# Patient Record
Sex: FEMALE | Race: WHITE | Employment: FULL TIME | URBAN - METROPOLITAN AREA
[De-identification: names, ages, dates, MRNs, and addresses within clinical notes are randomized per-mention and may not be internally consistent; named-entity substitution may affect disease eponyms.]

---

## 2020-02-18 ENCOUNTER — OFFICE VISIT (OUTPATIENT)
Dept: URGENT CARE | Facility: URGENT CARE | Age: 20
End: 2020-02-18
Payer: COMMERCIAL

## 2020-02-18 VITALS
DIASTOLIC BLOOD PRESSURE: 80 MMHG | HEIGHT: 66 IN | OXYGEN SATURATION: 96 % | TEMPERATURE: 98.4 F | WEIGHT: 175 LBS | SYSTOLIC BLOOD PRESSURE: 142 MMHG | HEART RATE: 89 BPM | BODY MASS INDEX: 28.12 KG/M2

## 2020-02-18 DIAGNOSIS — R30.0 DYSURIA: Primary | ICD-10-CM

## 2020-02-18 DIAGNOSIS — R31.0 GROSS HEMATURIA: ICD-10-CM

## 2020-02-18 LAB
ALBUMIN UR-MCNC: NEGATIVE MG/DL
APPEARANCE UR: ABNORMAL
BACTERIA #/AREA URNS HPF: ABNORMAL /HPF
BILIRUB UR QL STRIP: NEGATIVE
COLOR UR AUTO: YELLOW
GLUCOSE UR STRIP-MCNC: NEGATIVE MG/DL
HGB UR QL STRIP: ABNORMAL
KETONES UR STRIP-MCNC: NEGATIVE MG/DL
LEUKOCYTE ESTERASE UR QL STRIP: NEGATIVE
MUCOUS THREADS #/AREA URNS LPF: PRESENT /LPF
NITRATE UR QL: NEGATIVE
NON-SQ EPI CELLS #/AREA URNS LPF: ABNORMAL /LPF
PH UR STRIP: 5.5 PH (ref 5–7)
RBC #/AREA URNS AUTO: ABNORMAL /HPF
SOURCE: ABNORMAL
SP GR UR STRIP: >1.03 (ref 1–1.03)
UROBILINOGEN UR STRIP-ACNC: 0.2 EU/DL (ref 0.2–1)
WBC #/AREA URNS AUTO: ABNORMAL /HPF

## 2020-02-18 PROCEDURE — 81001 URINALYSIS AUTO W/SCOPE: CPT | Performed by: PHYSICIAN ASSISTANT

## 2020-02-18 PROCEDURE — 87186 SC STD MICRODIL/AGAR DIL: CPT | Performed by: PHYSICIAN ASSISTANT

## 2020-02-18 PROCEDURE — 99203 OFFICE O/P NEW LOW 30 MIN: CPT | Performed by: PHYSICIAN ASSISTANT

## 2020-02-18 PROCEDURE — 87086 URINE CULTURE/COLONY COUNT: CPT | Performed by: PHYSICIAN ASSISTANT

## 2020-02-18 PROCEDURE — 87088 URINE BACTERIA CULTURE: CPT | Performed by: PHYSICIAN ASSISTANT

## 2020-02-18 RX ORDER — NITROFURANTOIN 25; 75 MG/1; MG/1
100 CAPSULE ORAL 2 TIMES DAILY
Qty: 14 CAPSULE | Refills: 0 | Status: SHIPPED | OUTPATIENT
Start: 2020-02-18 | End: 2020-02-25

## 2020-02-18 ASSESSMENT — ENCOUNTER SYMPTOMS
HEARTBURN: 0
HEMATOCHEZIA: 0
WHEEZING: 0
CHILLS: 0
COUGH: 0
FEVER: 0
ABDOMINAL PAIN: 0
HEMATURIA: 1
RESPIRATORY NEGATIVE: 1
GASTROINTESTINAL NEGATIVE: 1
NAUSEA: 0
FLANK PAIN: 0
FATIGUE: 0
CHEST TIGHTNESS: 0
SHORTNESS OF BREATH: 0
DYSURIA: 1
DIARRHEA: 0
CARDIOVASCULAR NEGATIVE: 1
VOMITING: 0
PALPITATIONS: 0
CONSTIPATION: 0
FREQUENCY: 1

## 2020-02-18 ASSESSMENT — MIFFLIN-ST. JEOR: SCORE: 1580.54

## 2020-02-18 NOTE — LETTER
February 20, 2020      Fabi Hoyos  [address] [facility] [address]        Dear ,    We are writing to inform you of your test results.    urine culture results were positive for infection. The antibiotic you was given in clinic should cover this infection.  Finish the antibiotics and recheck in clinic if symptoms worsen or if symptoms do not improve.    Resulted Orders   UA reflex to Microscopic and Culture   Result Value Ref Range    Color Urine Yellow     Appearance Urine Slightly Cloudy     Glucose Urine Negative NEG^Negative mg/dL    Bilirubin Urine Negative NEG^Negative    Ketones Urine Negative NEG^Negative mg/dL    Specific Gravity Urine >1.030 1.003 - 1.035    Blood Urine Moderate (A) NEG^Negative    pH Urine 5.5 5.0 - 7.0 pH    Protein Albumin Urine Negative NEG^Negative mg/dL    Urobilinogen Urine 0.2 0.2 - 1.0 EU/dL    Nitrite Urine Negative NEG^Negative    Leukocyte Esterase Urine Negative NEG^Negative    Source Midstream Urine    Urine Microscopic   Result Value Ref Range    WBC Urine 0 - 5 OTO5^0 - 5 /HPF    RBC Urine 5-10 (A) OTO2^O - 2 /HPF    Squamous Epithelial /LPF Urine Moderate (A) FEW^Few /LPF    Bacteria Urine Moderate (A) NEG^Negative /HPF    Mucous Urine Present (A) NEG^Negative /LPF   Urine Culture Aerobic Bacterial   Result Value Ref Range    Specimen Description Midstream Urine     Culture Micro >100,000 colonies/mL  Escherichia coli   (A)        If you have any questions or concerns, please call the clinic at the number listed above.       Sincerely,        Janet Weber PA-C

## 2020-02-19 LAB
BACTERIA SPEC CULT: ABNORMAL
SPECIMEN SOURCE: ABNORMAL

## 2020-02-19 NOTE — PROGRESS NOTES
"Subjective   Fabi Hoyos is a 19 year old female who presents to clinic today for the following health issues:  HPI   URINARY TRACT SYMPTOMS    Duration: 2days    Description  dysuria, frequency, urgency and hematuria    Intensity:  moderate    Accompanying signs and symptoms:  Fever/chills: no   Flank pain no   Nausea and vomiting: no   Vaginal symptoms: No vaginal d/c, bleeding, rashes and irritation.  No new partners.   Abdominal/Pelvic Pain: Mild pelvic pain but no n/v, constipation, diarrhea, bloody or black tarry stools.  No fever, chills or sweats.    History  History of frequent UTI's: no   History of kidney stones: no   Sexually Active: no   Possibility of pregnancy: No    Precipitating or alleviating factors: None    Therapies tried and outcome: cranberry pill and increase fluid intake   Outcome: no relief    There is no problem list on file for this patient.    No past surgical history on file.    Social History     Tobacco Use     Smoking status: Never Smoker     Smokeless tobacco: Never Used   Substance Use Topics     Alcohol use: Not on file     No family history on file.      No current outpatient medications on file.     No Known Allergies    Reviewed and updated as needed this visit by Provider       Review of Systems   Constitutional: Negative for chills, fatigue and fever.   Respiratory: Negative.  Negative for cough, chest tightness, shortness of breath and wheezing.    Cardiovascular: Negative.  Negative for chest pain, palpitations and peripheral edema.   Gastrointestinal: Negative.  Negative for abdominal pain, constipation, diarrhea, heartburn, hematochezia, nausea and vomiting.   Genitourinary: Positive for dysuria, frequency, hematuria and urgency. Negative for flank pain, pelvic pain, vaginal bleeding and vaginal discharge.   All other systems reviewed and are negative.           Objective    BP (!) 142/80   Pulse 89   Temp 98.4  F (36.9  C) (Oral)   Ht 1.676 m (5' 6\")   Wt " 79.4 kg (175 lb)   SpO2 96%   BMI 28.25 kg/m    Body mass index is 28.25 kg/m .  Physical Exam  Vitals signs and nursing note reviewed.   Constitutional:       General: She is not in acute distress.     Appearance: Normal appearance. She is well-developed and normal weight. She is not ill-appearing.   Cardiovascular:      Rate and Rhythm: Normal rate and regular rhythm.      Pulses: Normal pulses.      Heart sounds: Normal heart sounds, S1 normal and S2 normal. No murmur. No friction rub. No gallop.    Pulmonary:      Effort: Pulmonary effort is normal. No accessory muscle usage or respiratory distress.      Breath sounds: Normal breath sounds and air entry. No decreased breath sounds, wheezing, rhonchi or rales.   Abdominal:      General: Abdomen is protuberant. Bowel sounds are normal.      Palpations: Abdomen is soft. There is no hepatomegaly, splenomegaly or mass.      Tenderness: There is abdominal tenderness in the suprapubic area. There is no right CVA tenderness, left CVA tenderness, guarding or rebound. Negative signs include Sims's sign, Rovsing's sign, McBurney's sign, psoas sign and obturator sign.      Hernia: No hernia is present.   Genitourinary:     Comments: Declined pelvic exam.  Skin:     General: Skin is warm and dry.   Neurological:      Mental Status: She is alert and oriented to person, place, and time.   Psychiatric:         Mood and Affect: Mood normal.         Behavior: Behavior normal.         Thought Content: Thought content normal.         Judgment: Judgment normal.     Diagnostic Test Results:  Labs reviewed in Epic  Results for orders placed or performed in visit on 02/18/20 (from the past 24 hour(s))   UA reflex to Microscopic and Culture   Result Value Ref Range    Color Urine Yellow     Appearance Urine Slightly Cloudy     Glucose Urine Negative NEG^Negative mg/dL    Bilirubin Urine Negative NEG^Negative    Ketones Urine Negative NEG^Negative mg/dL    Specific Gravity Urine  >1.030 1.003 - 1.035    Blood Urine Moderate (A) NEG^Negative    pH Urine 5.5 5.0 - 7.0 pH    Protein Albumin Urine Negative NEG^Negative mg/dL    Urobilinogen Urine 0.2 0.2 - 1.0 EU/dL    Nitrite Urine Negative NEG^Negative    Leukocyte Esterase Urine Negative NEG^Negative    Source Midstream Urine    Urine Microscopic   Result Value Ref Range    WBC Urine 0 - 5 OTO5^0 - 5 /HPF    RBC Urine 5-10 (A) OTO2^O - 2 /HPF    Squamous Epithelial /LPF Urine Moderate (A) FEW^Few /LPF    Bacteria Urine Moderate (A) NEG^Negative /HPF    Mucous Urine Present (A) NEG^Negative /LPF           Assessment & Plan   Dysuria:  UA and micro is suspicious for UTI and will empirically treat with [id number].  Will send for UC to help guide abx treatment.  Recommend increase fluids, regular voids, proper wiping techniques and voiding after intercourse.  Educated patient on warning signs of kidney infection and to go to the ER if she develops any of these symptoms.  Recheck in clinic if symptoms worsen or if symptoms do not improve.  -     UA reflex to Microscopic and Culture  -     Urine Microscopic  -     Urine Culture Aerobic Bacterial  -     nitroFURantoin macrocrystal-monohydrate 100 MG PO capsule; Take 1 capsule (100 mg) by mouth 2 times daily for 7 days    Gross hematuria:  Recheck UA 2weeks after completion of antibiotics to ensure resolution and r/o lesions/other causes.           Janet Weber PA-C  [facility]

## 2020-03-27 ENCOUNTER — VIRTUAL VISIT (OUTPATIENT)
Dept: FAMILY MEDICINE | Facility: OTHER | Age: 20
End: 2020-03-27

## 2020-03-27 NOTE — PROGRESS NOTES
"Date: 2020 14:13:30  Clinician: Rolo Junior  Clinician NPI: [id number]  Patient: Fabi Hoyos  Patient : 2000  Patient Address: [address] [place] [address], [place] [address]  Patient Phone: [phone]  Visit Protocol: UTI  Patient Summary:  Fabi is a 19 year old ( : 2000 ) female who initiated a Visit for a presumed bladder infection. When asked the question \"Please sign me up to receive news, health information and promotions from [employer].\", Fabi responded \"No\".   Her symptoms started 1-3 days ago and consist of dysuria, foul-smelling urine, feeling as if the bladder is never empty, abdominal pain, urinary frequency, urgency, and urinary incontinence.   Symptom details     Urine color: The color of her urine is orange.     Abdominal pain: The pain is mild (1-3 on a 10 point pain scale).      Denied symptoms include nausea, flank pain, chills, vaginal discharge, vomiting, and vaginal itching. She does not feel feverish.   Over-the-counter medications or home remedies used to relieve the current symptoms as reported by the patient (free text): Azo   Precipitating events  Fabi denies having a sexually transmitted disease.  Pertinent medical history  Fabi has had a bladder infection before and has had 2 in the past 12 months. Her most recent bladder infection was not within the last 4 weeks. Her current symptoms are similar to her previous bladder infection symptoms.   She is not sure what antibiotics have been effective in treating her past bladder infections.   Fabi has not been prescribed antibiotics to prevent frequent or repeated bladder infections in the past and does not get yeast infections when she takes antibiotics. She has not experienced problems or side effects with any of the common antibiotics used to treat bladder infections.   Fabi does not have a history of kidney stones. She has not used a catheter or been a patient in a hospital or nursing " home in the past 2 weeks.   Fabi does not smoke or use smokeless tobacco.   She denies pregnancy and denies breastfeeding. She has menstruated in the past month.     MEDICATIONS: No current medications, ALLERGIES: NKDA  Clinician Response:  Dear Fabi,  Based on the information you have provided, you likely have an acute urinary tract infection, also called a bladder infection. Bladder infections occur when bacteria from the outside of the body enters the urinary tract. Any part of the urinary system can be infected, but the bladder is the most common.  Medication information  I am prescribing:     Sulfamethoxazole-trimethoprim (Bactrim DS) 800-160 mg oral tablet. Take 1 tablet by mouth every 12 hours for 3 days. There are no refills with this prescription.   Certain antibiotics such as Bactrim and Ciprofloxacin can cause your skin to be more sensitive to the sun. Exposure to the sun when taking these antibiotics may cause skin rash, itching, redness, or a severe sunburn. Be sure to avoid prolonged or direct exposure to the sun, especially between 10 am and 3 pm, if possible. Wear protective clothing and use sunscreen of SPF 30 or higher when you are outdoors.  The medication I prescribed for your bladder infection is an antibiotic. Continue taking the medication until it is gone even if you feel better. If you get an upset stomach while taking antibiotics, taking the medication with food can help.   Yeast infections can be a common side effect of antibiotics. The most common symptom of a yeast infection is itchiness in and around the vagina. Other signs and symptoms include burning, redness, or a thick, white vaginal discharge that looks like cottage cheese and does not have a bad smell.  Self care  Urination helps to flush bacteria from the urinary tract. For this reason, drinking water and urinating often helps relieve some urinary symptoms and can decrease your risk of getting bladder infections in the  future.  Other steps you can take to prevent future bladder infections include:     Wipe front to back after using the bathroom    Urinate after sexual intercourse    Avoid using deodorant sprays, douches, or powders in the vaginal area     When to seek care  Please make an appointment to be seen in a clinic or urgent care if any of the following occur:     You develop new symptoms or your symptoms become worse    You have medication side effects that make it difficult to take them as prescribed    Your symptoms do not improve within 1-2 days of starting treatment    You have symptoms of a bladder infection that return shortly after completing treatment     It is possible to have an allergic reaction to an antibiotic even if you have not had one in the past. If you notice a new rash, significant swelling, or difficulty breathing, stop taking this medication immediately and go to a clinic or urgent care.   Diagnosis: Acute uncomplicated bladder infection  Diagnosis ICD: N39.0  Prescription: sulfamethoxazole-trimethoprim (Bactrim DS) 800-160 mg oral tablet 6 tablet, 3 days supply. Take 1 tablet by mouth every 12 hours for 3 days. Refills: 0, Refill as needed: no, Allow substitutions: yes  Pharmacy: [id number] - [phone] - [address] [place] [address]

## 2020-07-20 ENCOUNTER — VIRTUAL VISIT (OUTPATIENT)
Dept: FAMILY MEDICINE | Facility: OTHER | Age: 20
End: 2020-07-20

## 2020-07-20 NOTE — PROGRESS NOTES
"Date: 2020 10:15:43  Clinician: Volodymyr Khan  Clinician NPI: [id number]  Patient: Fabi Hoyos  Patient : 2000  Patient Address: [address] [place] [address], [place] [address]  Patient Phone: [phone]  Visit Protocol: UTI  Patient Summary:  Fabi is a 20 year old ( : 2000 ) female who initiated a Visit for a presumed bladder infection. When asked the question \"Please sign me up to receive news, health information and promotions from [employer].\", Fabi responded \"No\".   Her symptoms started today and consist of vaginal discharge, urinary frequency, urgency, dysuria, foul-smelling urine, nausea, and feeling as if the bladder is never empty. Fabi also feels feverish but was unable to measure her temperature.   Symptom details     Urine color: The color of her urine is yellow.     Vaginal discharge: The discharge is white in color and is smooth. The discharge is typical for her.      Denied symptoms include flank pain, abdominal pain, chills, urinary incontinence, vomiting, and vaginal itching.   Fabi has not used any over-the-counter medications or home remedies to relieve her current symptoms.  Precipitating events  Fabi denies having a sexually transmitted disease.  Pertinent medical history  Fabi has had a bladder infection before and has had 1 in the past 12 months. Her most recent bladder infection was not within the last 4 weeks. Her current symptoms are similar to her previous bladder infection symptoms.   She is not sure what antibiotics have been effective in treating her past bladder infections.   Fabi has not been prescribed antibiotics to prevent frequent or repeated bladder infections in the past and does not get yeast infections when she takes antibiotics. She has not experienced problems or side effects with any of the common antibiotics used to treat bladder infections.   Fabi does not have a history of kidney stones. She has not used a " catheter or been a patient in a hospital or nursing home in the past 2 weeks.   Fabi smokes or uses smokeless tobacco.   She denies pregnancy and denies breastfeeding. She has menstruated in the past month.     MEDICATIONS: No current medications, ALLERGIES: NKDA  Clinician Response:  Dear Fabi,  Based on the information you have provided, you likely have an acute urinary tract infection, also called a bladder infection. Bladder infections occur when bacteria from the outside of the body enters the urinary tract. Any part of the urinary system can be infected, but the bladder is the most common.  Medication information  I am prescribing:     Nitrofurantoin monohyd/m-cryst (Macrobid) 100 mg oral capsule. Take 1 capsule by mouth every 12 hours for 5 days. Take this medication with food. There are no refills with this prescription.   The medication I prescribed for your bladder infection is an antibiotic. Continue taking the medication until it is gone even if you feel better.   Yeast infections can be a common side effect of antibiotics. The most common symptom of a yeast infection is itchiness in and around the vagina. Other signs and symptoms include burning, redness, or a thick, white vaginal discharge that looks like cottage cheese and does not have a bad smell.  Self care  Urination helps to flush bacteria from the urinary tract. For this reason, drinking water and urinating often helps relieve some urinary symptoms and can decrease your risk of getting bladder infections in the future.  Other steps you can take to prevent future bladder infections include:     Wipe front to back after using the bathroom    Urinate after sexual intercourse    Avoid using deodorant sprays, douches, or powders in the vaginal area     Becoming tobacco-free is one of the best things you can do to improve your health. For help with quitting tobacco, you can call 7-696-QUIT-NOW (1-459.987.1109) or visit smokefree.gov.  When  to seek care  Please make an appointment to be seen in a clinic or urgent care if any of the following occur:     You develop new symptoms or your symptoms become worse    You have medication side effects that make it difficult to take them as prescribed    Your symptoms do not improve within 1-2 days of starting treatment    You have symptoms of a bladder infection that return shortly after completing treatment     It is possible to have an allergic reaction to an antibiotic even if you have not had one in the past. If you notice a new rash, significant swelling, or difficulty breathing, stop taking this medication immediately and go to a clinic or urgent care.   Diagnosis: Acute uncomplicated bladder infection  Diagnosis ICD: N39.0  Prescription: nitrofurantoin monohyd/m-cryst (Macrobid) 100 mg oral capsule 10 capsule, 5 days supply. Take 1 capsule by mouth every 12 hours for 5 days. Refills: 0, Refill as needed: no, Allow substitutions: yes  Pharmacy: [facility] [id number] [facility] - [phone] - [address] [place] [address]